# Patient Record
Sex: MALE | Race: AMERICAN INDIAN OR ALASKA NATIVE
[De-identification: names, ages, dates, MRNs, and addresses within clinical notes are randomized per-mention and may not be internally consistent; named-entity substitution may affect disease eponyms.]

---

## 2018-02-28 ENCOUNTER — HOSPITAL ENCOUNTER (OUTPATIENT)
Dept: HOSPITAL 5 - OR | Age: 48
Discharge: HOME | End: 2018-02-28
Attending: UROLOGY
Payer: MEDICARE

## 2018-02-28 VITALS — DIASTOLIC BLOOD PRESSURE: 86 MMHG | SYSTOLIC BLOOD PRESSURE: 150 MMHG

## 2018-02-28 DIAGNOSIS — N47.1: Primary | ICD-10-CM

## 2018-02-28 DIAGNOSIS — Z90.49: ICD-10-CM

## 2018-02-28 DIAGNOSIS — I69.951: ICD-10-CM

## 2018-02-28 DIAGNOSIS — Z21: ICD-10-CM

## 2018-02-28 DIAGNOSIS — G51.0: ICD-10-CM

## 2018-02-28 DIAGNOSIS — N48.1: ICD-10-CM

## 2018-02-28 DIAGNOSIS — E11.9: ICD-10-CM

## 2018-02-28 DIAGNOSIS — Z88.8: ICD-10-CM

## 2018-02-28 DIAGNOSIS — I10: ICD-10-CM

## 2018-02-28 PROCEDURE — 88304 TISSUE EXAM BY PATHOLOGIST: CPT

## 2018-02-28 PROCEDURE — 84132 ASSAY OF SERUM POTASSIUM: CPT

## 2018-02-28 PROCEDURE — 82962 GLUCOSE BLOOD TEST: CPT

## 2018-02-28 PROCEDURE — A6250 SKIN SEAL PROTECT MOISTURIZR: HCPCS

## 2018-02-28 RX ADMIN — HYDROMORPHONE HYDROCHLORIDE PRN MG: 1 INJECTION, SOLUTION INTRAMUSCULAR; INTRAVENOUS; SUBCUTANEOUS at 15:45

## 2018-02-28 RX ADMIN — HYDROMORPHONE HYDROCHLORIDE PRN MG: 1 INJECTION, SOLUTION INTRAMUSCULAR; INTRAVENOUS; SUBCUTANEOUS at 16:52

## 2018-02-28 RX ADMIN — HYDROMORPHONE HYDROCHLORIDE PRN MG: 1 INJECTION, SOLUTION INTRAMUSCULAR; INTRAVENOUS; SUBCUTANEOUS at 16:25

## 2018-02-28 RX ADMIN — HYDROMORPHONE HYDROCHLORIDE PRN MG: 1 INJECTION, SOLUTION INTRAMUSCULAR; INTRAVENOUS; SUBCUTANEOUS at 15:30

## 2018-02-28 NOTE — DISCHARGE SUMMARY
Short Stay Discharge Plan


Activity: other (no sex no straining )


Weight Bearing Status: Full Weight Bearing


Diet: low fat, low cholesterol, low salt, diabetic


Wound: open to air (remove dressing 9 pm )


Special Instructions: other (neosporin to incision bid )


Durable Medical Equipment Needed Upon Discharge: other (ce in RR and when awake 

24 hrs )


Follow up with: 


KAREN ZARATE MD [Primary Care Provider] - 7 Days


FUNMI DE JESUS MD [Staff Physician] - 14 Days

## 2018-02-28 NOTE — POST OPERATIVE NOTE
Date of procedure: 02/28/18


Pre-op diagnosis: phimosis 


Post-op diagnosis: same


Findings: 





as above


Procedure: 





circ


Anesthesia: GETA


Surgeon: FUNMI DE JESUS


Estimated blood loss: minimal


Pathology: list (foreskin)


Specimen disposition: to lab


Condition: stable


Disposition: PACU

## 2018-02-28 NOTE — ANESTHESIA CONSULTATION
Anesthesia Consult and Med Hx





- Airway


Anesthetic Teeth Evaluation: Chipped


ROM Head & Neck: Adequate


Mental/Hyoid Distance: Adequate


Mallampati Class: Class II


Intubation Access Assessment: Good





- Pulmonary Exam


CTA: Yes





- Cardiac Exam


Cardiac Exam: RRR





- Pre-Operative Health Status


ASA Pre-Surgery Classification: ASA3


Proposed Anesthetic Plan: General





- Pulmonary


Hx Smoking: No


Hx Asthma: No


COPD: No


Hx Pneumonia: Yes (2008)


Hx Sleep Apnea: No (JANELLE PRE SCREEN LOW RISK)





- Cardiovascular System


Hx Hypertension: Yes (2015)





- Central Nervous System


CVA: Yes (2015- RT SIDED WEAKNESS)





- Endocrine


Hx End Stage Renal Disease: No





- Other Systems


Hx Cancer: No

## 2018-02-28 NOTE — OPERATIVE REPORT
PREOPERATIVE DIAGNOSIS:  Severe phimosis, balanitis.



POSTOPERATIVE DIAGNOSIS:  Severe phimosis, balanitis.



PROCEDURE:  Circumcision, sleeve and dorsal slit technique.



SURGEON:  Garcia Montelongo MD



ANESTHESIA:  General.



FINDINGS:  The gentleman could not retract his foreskin.  He has a history of

sexually transmitted disease, HIV and diabetes, now presents for circumcision.



DESCRIPTION OF PROCEDURE:  The patient brought to the operating room and placed

on operating table.  Following the induction of anesthesia, placed in supine

position, prepped and draped in usual sterile fashion.  A circumferential

incision made in the penile skin.  The foreskin could not be retracted, so a

dorsal slit was made and we used Betadine to clean the glans.  The

circumferential incision was made on the inner foreskin and these were connected

dorsally.  Large amount of excess inflamed skin was removed.  At this point, the

area was irrigated.  Any small vessels were cauterized or tied with 3-0 Vicryl. 

Sutures were placed at 12, 3, 6, and 9 o'clock position.  The frenulum was

partially opened and was reconstructed with 4-0 chromic.  At this point, after

all the sutures were in place, a circumferential closure was carried out with

3-0 chromic.  The patient tolerated the procedure well.  No significant

complication.  Estimated blood loss less than 5 mL.  Brought to recovery in

stable condition.





DD: 02/28/2018 15:09

DT: 02/28/2018 18:51

JOB# [id number]  [id number]

STEWART/ANITA